# Patient Record
Sex: FEMALE | Race: WHITE
[De-identification: names, ages, dates, MRNs, and addresses within clinical notes are randomized per-mention and may not be internally consistent; named-entity substitution may affect disease eponyms.]

---

## 2018-07-24 ENCOUNTER — HOSPITAL ENCOUNTER (OUTPATIENT)
Dept: HOSPITAL 74 - JASU-SURG | Age: 68
LOS: 1 days | Discharge: HOME | End: 2018-07-25
Attending: OBSTETRICS & GYNECOLOGY
Payer: COMMERCIAL

## 2018-07-24 VITALS — BODY MASS INDEX: 20.5 KG/M2

## 2018-07-24 DIAGNOSIS — E11.9: ICD-10-CM

## 2018-07-24 DIAGNOSIS — Z79.84: ICD-10-CM

## 2018-07-24 DIAGNOSIS — D27.0: Primary | ICD-10-CM

## 2018-07-24 DIAGNOSIS — N85.8: ICD-10-CM

## 2018-07-24 DIAGNOSIS — I10: ICD-10-CM

## 2018-07-24 PROCEDURE — 0UT0FZZ RESECTION OF RIGHT OVARY, VIA NATURAL OR ARTIFICIAL OPENING WITH PERCUTANEOUS ENDOSCOPIC ASSISTANCE: ICD-10-PCS | Performed by: OBSTETRICS & GYNECOLOGY

## 2018-07-24 PROCEDURE — 58552 LAPARO-VAG HYST INCL T/O: CPT

## 2018-07-24 PROCEDURE — 0UT5FZZ RESECTION OF RIGHT FALLOPIAN TUBE, VIA NATURAL OR ARTIFICIAL OPENING WITH PERCUTANEOUS ENDOSCOPIC ASSISTANCE: ICD-10-PCS | Performed by: OBSTETRICS & GYNECOLOGY

## 2018-07-24 PROCEDURE — 8E0W4CZ ROBOTIC ASSISTED PROCEDURE OF TRUNK REGION, PERCUTANEOUS ENDOSCOPIC APPROACH: ICD-10-PCS | Performed by: OBSTETRICS & GYNECOLOGY

## 2018-07-24 PROCEDURE — 0UT9FZZ RESECTION OF UTERUS, VIA NATURAL OR ARTIFICIAL OPENING WITH PERCUTANEOUS ENDOSCOPIC ASSISTANCE: ICD-10-PCS | Performed by: OBSTETRICS & GYNECOLOGY

## 2018-07-24 RX ADMIN — ACETAMINOPHEN SCH MG: 325 TABLET ORAL at 17:31

## 2018-07-24 RX ADMIN — KETOROLAC TROMETHAMINE SCH MG: 15 INJECTION, SOLUTION INTRAMUSCULAR; INTRAVENOUS at 18:21

## 2018-07-24 RX ADMIN — INSULIN ASPART SCH UNIT: 100 INJECTION, SOLUTION INTRAVENOUS; SUBCUTANEOUS at 21:35

## 2018-07-24 RX ADMIN — KETOROLAC TROMETHAMINE SCH: 30 INJECTION INTRAMUSCULAR; INTRAVENOUS at 19:36

## 2018-07-24 RX ADMIN — INSULIN ASPART SCH UNITS: 100 INJECTION, SOLUTION INTRAVENOUS; SUBCUTANEOUS at 15:32

## 2018-07-24 RX ADMIN — METFORMIN HYDROCHLORIDE SCH MG: 500 TABLET ORAL at 17:21

## 2018-07-24 RX ADMIN — KETOROLAC TROMETHAMINE SCH: 15 INJECTION, SOLUTION INTRAMUSCULAR; INTRAVENOUS at 16:11

## 2018-07-24 RX ADMIN — GLIPIZIDE SCH MG: 10 TABLET ORAL at 19:27

## 2018-07-24 RX ADMIN — ACETAMINOPHEN SCH: 325 TABLET ORAL at 23:45

## 2018-07-24 RX ADMIN — ACETAMINOPHEN SCH: 325 TABLET ORAL at 16:12

## 2018-07-24 RX ADMIN — INSULIN ASPART SCH: 100 INJECTION, SOLUTION INTRAVENOUS; SUBCUTANEOUS at 17:35

## 2018-07-24 NOTE — OP
DATE OF OPERATION:  2018

 

PREOPERATIVE DIAGNOSES:  

1.  Right adnexal mass. 

2.  Fluid in endometrial cavity.  

3.  History of left salpingo-oophorectomy. 

 

POSTOPERATIVE DIAGNOSES:  

1.  Right adnexal mass. 

2.  Fluid in endometrial cavity.  

3.  History of left salpingo-oophorectomy. 

 

PROCEDURES:  

1.  Robotic-assisted total hysterectomy.  

2.  Right salpingo-oophorectomy. 

3.  Lysis of adhesions. 

 

SURGEON:  Nelda Herring MD

 

CO-SURGEON:  Manuela Mukherjee MD

 

ANESTHESIA:  General endotracheal and local. 

 

ESTIMATED BLOOD LOSS:  50 mL.   

 

COMPLICATIONS:  None.

 

INDICATIONS:  This is a 68-year-old  4, para 4, who was found on routine

pelvic exam to have a pelvic mass.  She also reported pressure and heaviness with

walking.  Pelvic ultrasound 2018, showed the uterus to be 4.4 x 4.4 x 2.3 cm

with ill-defined endometrial stripe, which was filled with fluid.  Patient denied

history of vaginal bleeding.  She had endometrial biopsy in the office which was

benign.  Of note, patient also had history of an ovarian cystectomy, exploratory

laparotomy at age 30 for an ovarian cyst, and  sections x3.  Patient was

counseled regarding surgical management.  Risks, benefits, indications, and

alternatives were discussed with the patient.  All questions were answered.  Informed

consent was signed.

 

FINDINGS:  Examination under anesthesia:  Cervix no lesions, vagina no lesions,

uterus approximately 6 weeks size, large ovarian mass palpated to the umbilicus. 

Intraabdominal findings:  There were extensive omental and small bowel adhesions to

the midline from her previous surgery.  Liver edges smooth.  There was no ascites. 

There was no evidence of disease in the abdomen and pelvis.  The right ovary

contained a 12-cm smooth-walled cyst.  The uterus was approximately 6 weeks size and

adherent to the anterior abdominal wall with adhesions.  The left tube and ovary were

surgically absent.  The cul-de-sac was clean.  

 

DESCRIPTION OF PROCEDURE:  The patient was taken to the operating room, placed in the

dorsal supine position.  General endotracheal anesthesia was obtained without

difficulty.  She was placed in the dorsal lithotomy position in Giovany stirrups and

prepped and draped in a normal sterile fashion.  Mohamud catheter was placed in the

bladder.  Speculum was placed in the vagina.  The cervix was grasped with a

single-tooth tenaculum, and a figure-of-8 stitch of 0 Vicryl was placed.  Amaraare

uterine manipulator was placed.  Tenaculum and speculum were then removed.  

 

Attention was turned to the patients abdomen.  Then, 5 mL of 0.25% Marcaine were

injected into the umbilicus, and an 8-mm incision was made with a scalpel.  While

tenting the anterior abdominal wall, the Veress needle was inserted intraabdominally.

 The abdomen was insufflated with CO2 gas.  Then, 8-mm trocar was placed. 

Intraabdominal placement was confirmed by direct visualization with a laparoscope. 

Additional 8-mm trocar was placed in the left mid quadrant, and at this point,

extensive adhesiolysis was performed requiring approximately 1 hour of time in order

to free the omentum and the small bowel from the anterior abdominal wall.  

 

A 5-mm aerosol port was placed in the left lower quadrant, and an 8-mm trocar was

placed in the right mid quadrant.  All trocars were placed under direct visualization

after injecting 0.25% Marcaine.  A thorough examination of the abdomen and pelvis

revealed the above noted findings.  Peritoneal washings were taken using normal

saline.  The da Balwinder Robot was then docked without difficulty.  

 

The right adnexa was elevated.  The right ureter was identified.  The right

infundibulopelvic ligament was clamped, cauterized, and transected.  This was carried

through the broad ligament and the round ligament and the vesicouterine peritoneum

anteriorly.  The left round ligament was clamped, cauterized, and transected.  The

bladder flap was created anteriorly dissecting the bladder off the anterior cervix

and cervix and upper vagina.  The uterine arteries bilaterally were skeletonized. 

They were cauterized and transected.  The cardinal ligaments were clamped,

cauterized, and transected.  The uterosacral ligaments were clamped, cauterized, and

transected.  

 

The vaginotomy incision was made circumferentially around the cervix.  The

utero-ovarian ligament on the right was clamped, cauterized, and transected.  The

uterus and cervix were passed through the vagina.  The right adnexa was handed off

the field in an EndoCatch bag through the vagina.  Clear serous fluid was aspirated

through the bag in order to facilitate its removal.  

 

The vaginal cuff was closed in a running continuous fashion using 2-0 V-Loc suture. 

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  Pelvis was

irrigated, noted to be hemostatic.  All instruments were removed from the patients

abdomen.  The da Balwinder robot was then undocked.  Trocars were removed.  Skin was

closed with 4-0 Monocryl, and Dermabond was applied.  The patient was extubated and

transferred in stable condition to the PACU.  

 

 

SERJIO Sutton/[id number]

DD: 2018 11:07

DT: 2018 12:03

Job #:  [id number]

## 2018-07-24 NOTE — HP
History & Physical Update





- History


History: No Change





- Physical


Physical: No Change





- Assessment


Assessment: No Change





- Plan


Currently as noted:: robotic hysterectomy, BSO, possible staging

## 2018-07-25 VITALS — HEART RATE: 68 BPM | TEMPERATURE: 98.4 F | SYSTOLIC BLOOD PRESSURE: 118 MMHG | DIASTOLIC BLOOD PRESSURE: 62 MMHG

## 2018-07-25 LAB
ANION GAP SERPL CALC-SCNC: 8 MMOL/L (ref 8–16)
BUN SERPL-MCNC: 11 MG/DL (ref 7–18)
CALCIUM SERPL-MCNC: 8 MG/DL (ref 8.5–10.1)
CHLORIDE SERPL-SCNC: 109 MMOL/L (ref 98–107)
CO2 SERPL-SCNC: 25 MMOL/L (ref 21–32)
CREAT SERPL-MCNC: 0.5 MG/DL (ref 0.55–1.02)
DEPRECATED RDW RBC AUTO: 13.7 % (ref 11.6–15.6)
GLUCOSE SERPL-MCNC: 77 MG/DL (ref 74–106)
HCT VFR BLD CALC: 32 % (ref 32.4–45.2)
HGB BLD-MCNC: 10.8 GM/DL (ref 10.7–15.3)
MCH RBC QN AUTO: 31.2 PG (ref 25.7–33.7)
MCHC RBC AUTO-ENTMCNC: 33.9 G/DL (ref 32–36)
MCV RBC: 92 FL (ref 80–96)
PLATELET # BLD AUTO: 173 K/MM3 (ref 134–434)
PMV BLD: 10 FL (ref 7.5–11.1)
POTASSIUM SERPLBLD-SCNC: 4 MMOL/L (ref 3.5–5.1)
RBC # BLD AUTO: 3.47 M/MM3 (ref 3.6–5.2)
SODIUM SERPL-SCNC: 142 MMOL/L (ref 136–145)
WBC # BLD AUTO: 8.2 K/MM3 (ref 4–10)

## 2018-07-25 RX ADMIN — KETOROLAC TROMETHAMINE SCH MG: 30 INJECTION INTRAMUSCULAR; INTRAVENOUS at 01:49

## 2018-07-25 RX ADMIN — KETOROLAC TROMETHAMINE SCH: 30 INJECTION INTRAMUSCULAR; INTRAVENOUS at 04:34

## 2018-07-25 RX ADMIN — METFORMIN HYDROCHLORIDE SCH MG: 500 TABLET ORAL at 06:22

## 2018-07-25 RX ADMIN — INSULIN ASPART SCH UNIT: 100 INJECTION, SOLUTION INTRAVENOUS; SUBCUTANEOUS at 11:56

## 2018-07-25 RX ADMIN — KETOROLAC TROMETHAMINE SCH MG: 30 INJECTION INTRAMUSCULAR; INTRAVENOUS at 09:08

## 2018-07-25 RX ADMIN — INSULIN ASPART SCH: 100 INJECTION, SOLUTION INTRAVENOUS; SUBCUTANEOUS at 06:22

## 2018-07-25 RX ADMIN — ACETAMINOPHEN SCH: 325 TABLET ORAL at 11:15

## 2018-07-25 RX ADMIN — GLIPIZIDE SCH MG: 10 TABLET ORAL at 06:22

## 2018-07-25 RX ADMIN — ACETAMINOPHEN SCH MG: 325 TABLET ORAL at 06:25

## 2018-07-25 NOTE — PATH
Surgical Pathology Report



Patient Name:  LORNE VERAS

Accession #:  [id number]

[facility]. Rec. #:  [id number]                                                        

   /Age/Gender:  1950 (Age: 68) / F

Account:  [id number]                                                          

             Location: AMBULATORY SURG

Taken:  2018

Received:  2018

Reported:  2018

Physicians:  Nelda Herring MD

  



Specimen(s) Received

A: RIGHT OVARY AND TUBE 

B: UTERUS AND CERVIX 





Clinical History

Ovarian cyst, thickened endometrium





Intraoperative Consult Diagnosis

Right ovary, frozen section: Serous neoplasm, favor serous cystadenoma in

representative sections.



KY Valdez M.D., 18









Final Diagnosis

A. OVARY AND FALLOPIAN TUBE, RIGHT, SALPINGO-OOPHORECTOMY(FS):

SEROUS CYSTADENOFIBROMA, 13 CM.

SCANT RESIDUAL OVARIAN TISSUE NOTED.

NO FALLOPIAN TUBE IDENTIFIED.



B. UTERUS AND CERVIX, ROBOTIC ASSISTED LAPAROSCOPIC HYSTERECTOMY:

ATROPHIC ENDOMETRIUM.

CERVIX WITHOUT SIGNIFICANT PATHOLOGIC FINDINGS.

MYOMETRIUM WITHOUT SIGNIFICANT PATHOLOGIC FINDINGS.

SEROSA WITH FOCAL ADHESIONS.



Comment: See concurrent pelvic washing cytology (). 







***Electronically Signed***

Hannah White M.D.





Gross Description

A. Received fresh labeled "right ovary and tube," is a 230 g, 13.0 x 10.0 x 2.5

cm intact cyst. The outer surface is tan-pink and smooth. The cyst lumen

contains tan serous fluid. The inner lining displays multifocal tan papillary

excrescences. There is no fallopian tube identified. Representative sections are

submitted for frozen section. Representative sections are submitted in 8

cassettes with the frozen section residue in cassette 1.



B. Received in formalin labeled "uterus and cervix," is a 35 g uterus with an

attached cervix and no attached adnexa. There is no adnexa identified separately

within the container. The specimen measures 7 cm from superior to inferior, 4.5

cm from left to right and 2.3 cm from anterior to posterior. The serosa is

tan-pink with focal adhesions. The attached cervix measures 3 cm in length and

averages 2.5 cm in diameter. The ectocervix is tan-pink, smooth and glistening.

The endocervix is unremarkable. The endometrial cavity measures 2.8 cm in length

and 1.8 cm from cornu to cornu. The endometrium is red and averages 0.1 cm in

thickness. The myometrium is tan-pink and measures up to 1.2 cm in thickness.

There are no intramural nodules identified. Representative sections are

submitted in 6 cassettes as follows: 1-anterior cervix; 2-posterior cervix;

3-4-anterior endomyometrium; 5-6-posterior endomyometrium.

## 2018-07-25 NOTE — PN
Progress Note, Physician


Chief Complaint: 





no complains feels well


ambulating, voiding, tolerating regular diet





- Current Medication List


Current Medications: 


Active Medications





Acetaminophen (Tylenol -)  650 mg PO Q6H [facility]


   Last Admin: 07/25/18 06:25 Dose:  650 mg


Aspirin (Asa -)  81 mg PO DAILY [facility]


   Last Admin: 07/25/18 09:32 Dose:  81 mg


Atorvastatin Calcium (Lipitor -)  10 mg PO HS [facility]


   Last Admin: 07/24/18 21:36 Dose:  10 mg


Diphenhydramine HCl (Benadryl -)  25 mg PO Q6H PRN


   PRN Reason: FOR ITCHING


Fentanyl (Sublimaze Injection -)  25 mcg IVPUSH [id number] PRN


   PRN Reason: PAIN-PACU ORDER X 4 DOSES ONLY


   Last Admin: 07/24/18 12:00 Dose:  25 mcg


Glipizide (Glucotrol -)  10 mg PO BIDAC [facility]


   Last Admin: 07/25/18 06:22 Dose:  10 mg


Sodium Chloride (1/2 Normal Saline)  1,000 mls @ 125 mls/hr IV ASDIR [facility]


   Last Admin: 07/24/18 19:00 Dose:  125 mls/hr


Insulin Aspart (Novolog Vial Sliding Scale -)  1 vial SQ ACHS [facility]; Protocol


   Last Admin: 07/25/18 11:56 Dose:  2 unit


Ketorolac Tromethamine (Toradol Injection -)  15 mg IVPUSH Q6H-IV [facility]


   Stop: 07/29/18 18:14


   Last Admin: 07/25/18 09:08 Dose:  15 mg


Metformin HCl (Glucophage -)  1,000 mg PO BIDAC [facility]


   Last Admin: 07/25/18 06:22 Dose:  1,000 mg


Metoclopramide HCl (Reglan Injection -)  10 mg IVPUSH Q6H PRN


   PRN Reason: NAUSEA AND/OR VOMITING


Morphine Sulfate (Morphine Sulfate)  2 mg IVPUSH Q6H PRN


   PRN Reason: PAIN LEVEL 7 - 10


Multivitamins/Minerals/Vitamin C (Tab-A-Vit -)  1 tab PO DAILY [facility]


   Last Admin: 07/25/18 09:32 Dose:  1 tab


Ondansetron HCl (Zofran Injection)  8 mg IVPB Q8H PRN


   PRN Reason: NAUSEA


Pantoprazole Sodium (Protonix -)  40 mg PO DAILY [facility]


   Last Admin: 07/25/18 09:35 Dose:  40 mg


Prochlorperazine Edisylate (Compazine Injection -)  25 mg IVPB Q6H PRN


   PRN Reason: NAUSEA AND/OR VOMITING


Ranitidine HCl (Zantac -)  300 mg PO DAILY [facility]


   Last Admin: 07/25/18 09:33 Dose:  300 mg


Sitagliptin Phosphate (Januvia -)  100 mg PO DAILY@0700 [facility]


   Last Admin: 07/25/18 06:22 Dose:  100 mg











- Objective


Vital Signs: 


 Vital Signs











Temperature  98.5 F   07/25/18 06:00


 


Pulse Rate  77   07/25/18 06:00


 


Respiratory Rate  20   07/25/18 06:00


 


Blood Pressure  112/65   07/25/18 06:00


 


O2 Sat by Pulse Oximetry (%)  98   07/24/18 21:00











Constitutional: Yes: Well Nourished, No Distress, Calm


Eyes: Yes: WNL, Conjunctiva Clear, EOM Intact


HENT: Yes: WNL, Atraumatic, Normocephalic


Neck: Yes: WNL, Supple, Trachea Midline


Cardiovascular: Yes: WNL, Regular Rate and Rhythm


Respiratory: Yes: WNL, Regular, CTA Bilaterally


Gastrointestinal: Yes: WNL, Normal Bowel Sounds, Soft


Genitourinary: Yes: WNL


Musculoskeletal: Yes: WNL


Extremities: Yes: WNL


Edema: No


Labs: 


 CBC, BMP





 07/25/18 06:00 





 07/25/18 06:00 











Assessment/Plan





69yo P3 s/p Robotic Hysterectomy RSO


for R adnexal mass


Doing well, VSS, Afebrile


D/C home


NPV x 6wks


RTO 2& 4 weeks

## 2018-07-25 NOTE — PN
Progress Note, Physician


Chief Complaint: 





s/p robotic assisted lap hysterectomy under general anesthesia


History of Present Illness: 





post op day one





- Current Medication List


Current Medications: 


Active Medications





Acetaminophen (Tylenol -)  650 mg PO Q6H [facility]


   Last Admin: 07/25/18 06:25 Dose:  650 mg


Aspirin (Asa -)  81 mg PO DAILY [facility]


   Last Admin: 07/25/18 09:32 Dose:  81 mg


Atorvastatin Calcium (Lipitor -)  10 mg PO HS [facility]


   Last Admin: 07/24/18 21:36 Dose:  10 mg


Diphenhydramine HCl (Benadryl -)  25 mg PO Q6H PRN


   PRN Reason: FOR ITCHING


Fentanyl (Sublimaze Injection -)  25 mcg IVPUSH [id number] PRN


   PRN Reason: PAIN-PACU ORDER X 4 DOSES ONLY


   Last Admin: 07/24/18 12:00 Dose:  25 mcg


Glipizide (Glucotrol -)  10 mg PO BIDAC [facility]


   Last Admin: 07/25/18 06:22 Dose:  10 mg


Sodium Chloride (1/2 Normal Saline)  1,000 mls @ 125 mls/hr IV ASDIR [facility]


   Last Admin: 07/24/18 19:00 Dose:  125 mls/hr


Insulin Aspart (Novolog Vial Sliding Scale -)  1 vial SQ ACHS [facility]; Protocol


   Last Admin: 07/25/18 06:22 Dose:  Not Given


Ketorolac Tromethamine (Toradol Injection -)  15 mg IVPUSH Q6H-IV [facility]


   Stop: 07/29/18 18:14


   Last Admin: 07/25/18 09:08 Dose:  15 mg


Metformin HCl (Glucophage -)  1,000 mg PO BIDAC [facility]


   Last Admin: 07/25/18 06:22 Dose:  1,000 mg


Metoclopramide HCl (Reglan Injection -)  10 mg IVPUSH Q6H PRN


   PRN Reason: NAUSEA AND/OR VOMITING


Morphine Sulfate (Morphine Sulfate)  2 mg IVPUSH Q6H PRN


   PRN Reason: PAIN LEVEL 7 - 10


Multivitamins/Minerals/Vitamin C (Tab-A-Vit -)  1 tab PO DAILY [facility]


   Last Admin: 07/25/18 09:32 Dose:  1 tab


Ondansetron HCl (Zofran Injection)  8 mg IVPB Q8H PRN


   PRN Reason: NAUSEA


Pantoprazole Sodium (Protonix -)  40 mg PO DAILY [facility]


   Last Admin: 07/25/18 09:35 Dose:  40 mg


Prochlorperazine Edisylate (Compazine Injection -)  25 mg IVPB Q6H PRN


   PRN Reason: NAUSEA AND/OR VOMITING


Ranitidine HCl (Zantac -)  300 mg PO DAILY [facility]


   Last Admin: 07/25/18 09:33 Dose:  300 mg


Sitagliptin Phosphate (Januvia -)  100 mg PO DAILY@0700 [facility]


   Last Admin: 07/25/18 06:22 Dose:  100 mg











- Objective


Vital Signs: 


 Vital Signs











Temperature  98.5 F   07/25/18 06:00


 


Pulse Rate  77   07/25/18 06:00


 


Respiratory Rate  20   07/25/18 06:00


 


Blood Pressure  112/65   07/25/18 06:00


 


O2 Sat by Pulse Oximetry (%)  98   07/24/18 21:00











Constitutional: Yes: Well Nourished


Cardiovascular: Yes: WNL


Respiratory: Yes: WNL


Gastrointestinal: Yes: WNL


Labs: 


 CBC, BMP





 07/25/18 06:00 





 07/25/18 06:00 











Assessment/Plan





Pain controlled, no adverse effect of anesthetic, sitting in a  chair, 

tolerating liquids.  Dept of anesthesiology will sign off care at this time

## 2018-07-25 NOTE — PATH
Cytology Non-Gynecological Report



Patient Name:  LORNE VERAS

Accession #:  

[facility]. Rec. #:  [id number]                                                        

   /Age/Gender:  1950 (Age: 68) / F

Account:  [id number]                                                          

             Location: AMBULATORY SURG

Taken:  2018

Received:  2018

Reported:  2018

Physicians:  Nelda Herring MD

  



Specimen(s) Received

 PELVIC WASHINGS 





Clinical History

Ovarian cyst







Final Diagnosis

PELVIC WASHING FOR CYTOLOGY:

SATISFACTORY FOR EVALUATION.

NO MALIGNANT CELLS IDENTIFIED.

MESOTHELIAL CELLS PRESENT.



Comment: See concurrent material ([id number]).





***Electronically Signed***

Hannah White M.D.





Gross Description

Approximately 40 cc of yellow fluid received fresh.  Two direct smears and one

cellblock prepared.